# Patient Record
Sex: MALE | Race: WHITE | NOT HISPANIC OR LATINO | Employment: OTHER | URBAN - METROPOLITAN AREA
[De-identification: names, ages, dates, MRNs, and addresses within clinical notes are randomized per-mention and may not be internally consistent; named-entity substitution may affect disease eponyms.]

---

## 2017-12-20 ENCOUNTER — ESTABLISHED PATIENT (OUTPATIENT)
Dept: URBAN - METROPOLITAN AREA CLINIC 39 | Facility: CLINIC | Age: 77
End: 2017-12-20

## 2017-12-20 ENCOUNTER — SURGERY/PROCEDURE (OUTPATIENT)
Dept: URBAN - METROPOLITAN AREA SURGERY 14 | Facility: SURGERY | Age: 77
End: 2017-12-20

## 2017-12-20 DIAGNOSIS — H26.491: ICD-10-CM

## 2017-12-20 PROCEDURE — 66821 AFTER CATARACT LASER SURGERY: CPT

## 2017-12-20 PROCEDURE — 92014 COMPRE OPH EXAM EST PT 1/>: CPT

## 2017-12-20 ASSESSMENT — TONOMETRY
OD_IOP_MMHG: 14
OS_IOP_MMHG: 15

## 2017-12-20 ASSESSMENT — VISUAL ACUITY
OD_SC: 20/40+1
OS_SC: 20/40-1
OD_GLARE: 20/80
OD_SC: J12
OS_SC: J6

## 2017-12-28 ENCOUNTER — POST-OP (OUTPATIENT)
Dept: URBAN - METROPOLITAN AREA CLINIC 39 | Facility: CLINIC | Age: 77
End: 2017-12-28

## 2017-12-28 DIAGNOSIS — Z96.1: ICD-10-CM

## 2017-12-28 PROCEDURE — 99024 POSTOP FOLLOW-UP VISIT: CPT

## 2017-12-28 ASSESSMENT — VISUAL ACUITY
OD_SC: J10
OS_SC: J10
OS_SC: 20/30
OD_SC: 20/30-2

## 2017-12-28 ASSESSMENT — TONOMETRY
OS_IOP_MMHG: 14
OD_IOP_MMHG: 14

## 2018-06-22 ENCOUNTER — ESTABLISHED COMPREHENSIVE EXAM (OUTPATIENT)
Dept: URBAN - METROPOLITAN AREA CLINIC 39 | Facility: CLINIC | Age: 78
End: 2018-06-22

## 2018-06-22 DIAGNOSIS — H35.3130: ICD-10-CM

## 2018-06-22 DIAGNOSIS — H35.373: ICD-10-CM

## 2018-06-22 DIAGNOSIS — Z96.1: ICD-10-CM

## 2018-06-22 PROCEDURE — 92134 CPTRZ OPH DX IMG PST SGM RTA: CPT

## 2018-06-22 PROCEDURE — 92014 COMPRE OPH EXAM EST PT 1/>: CPT

## 2018-06-22 PROCEDURE — 92015 DETERMINE REFRACTIVE STATE: CPT

## 2018-06-22 ASSESSMENT — TONOMETRY
OD_IOP_MMHG: 17
OS_IOP_MMHG: 27

## 2018-06-22 ASSESSMENT — VISUAL ACUITY
OD_SC: J8
OS_SC: J3
OS_SC: 20/40+2
OD_SC: 20/40-2

## 2019-06-25 ENCOUNTER — ESTABLISHED COMPREHENSIVE EXAM (OUTPATIENT)
Dept: URBAN - METROPOLITAN AREA CLINIC 39 | Facility: CLINIC | Age: 79
End: 2019-06-25

## 2019-06-25 DIAGNOSIS — H35.3130: ICD-10-CM

## 2019-06-25 DIAGNOSIS — H43.813: ICD-10-CM

## 2019-06-25 DIAGNOSIS — H35.373: ICD-10-CM

## 2019-06-25 DIAGNOSIS — Z96.1: ICD-10-CM

## 2019-06-25 PROCEDURE — 92014 COMPRE OPH EXAM EST PT 1/>: CPT

## 2019-06-25 PROCEDURE — 92134 CPTRZ OPH DX IMG PST SGM RTA: CPT

## 2019-06-25 PROCEDURE — 92015 DETERMINE REFRACTIVE STATE: CPT

## 2019-06-25 ASSESSMENT — VISUAL ACUITY
OS_CC: J4
OD_SC: <J12
OD_CC: J6
OD_SC: 20/60+2
OS_SC: <J12
OS_SC: 20/50+1

## 2019-06-25 ASSESSMENT — TONOMETRY
OD_IOP_MMHG: 13
OS_IOP_MMHG: 15

## 2020-01-07 ENCOUNTER — FOLLOW UP (OUTPATIENT)
Dept: URBAN - METROPOLITAN AREA CLINIC 39 | Facility: CLINIC | Age: 80
End: 2020-01-07

## 2020-01-07 DIAGNOSIS — H35.373: ICD-10-CM

## 2020-01-07 DIAGNOSIS — H35.3130: ICD-10-CM

## 2020-01-07 PROCEDURE — 92134 CPTRZ OPH DX IMG PST SGM RTA: CPT

## 2020-01-07 PROCEDURE — 92012 INTRM OPH EXAM EST PATIENT: CPT

## 2020-01-07 ASSESSMENT — TONOMETRY
OD_IOP_MMHG: 17
OS_IOP_MMHG: 22

## 2020-01-07 ASSESSMENT — VISUAL ACUITY
OD_SC: <J12
OS_SC: 20/40+1
OU_SC: 20/30-2
OD_SC: 20/40-2
OD_CC: J3
OS_CC: J1
OU_SC: J10
OS_SC: <J12
OU_CC: J1

## 2020-07-06 NOTE — PATIENT DISCUSSION
New Prescription: prednisol ace-gatiflox-bromfen (prednisol ace-gatiflox-bromfen): drops,suspension: 1-0.5-0.075% 1 drop three times a day into affected eye 07-

## 2020-07-13 ENCOUNTER — ESTABLISHED COMPREHENSIVE EXAM (OUTPATIENT)
Dept: URBAN - METROPOLITAN AREA CLINIC 40 | Facility: CLINIC | Age: 80
End: 2020-07-13

## 2020-07-13 DIAGNOSIS — H35.373: ICD-10-CM

## 2020-07-13 DIAGNOSIS — H35.3130: ICD-10-CM

## 2020-07-13 DIAGNOSIS — H52.4: ICD-10-CM

## 2020-07-13 DIAGNOSIS — H52.11: ICD-10-CM

## 2020-07-13 DIAGNOSIS — H43.813: ICD-10-CM

## 2020-07-13 DIAGNOSIS — H52.202: ICD-10-CM

## 2020-07-13 PROCEDURE — 92499OP2 OPTOMAP RETINAL SCREENING BOTH EYES

## 2020-07-13 PROCEDURE — 92134 CPTRZ OPH DX IMG PST SGM RTA: CPT

## 2020-07-13 PROCEDURE — 92015 DETERMINE REFRACTIVE STATE: CPT

## 2020-07-13 PROCEDURE — 92014 COMPRE OPH EXAM EST PT 1/>: CPT

## 2020-07-13 ASSESSMENT — TONOMETRY
OS_IOP_MMHG: 20
OD_IOP_MMHG: 14

## 2020-07-13 ASSESSMENT — VISUAL ACUITY
OD_SC: <J12
OS_SC: 20/40-1
OU_CC: J1
OU_SC: 20/30-2
OD_CC: J3
OS_SC: <J12
OS_CC: J2

## 2020-08-21 NOTE — PATIENT DISCUSSION
- OK TO PROCEED WITH CATARACT SURGERY IN 5 DAYS.  PLANNING FOR STANDARD MONOFOCAL IOL FOR DISTANCE WITH KDB, LEFT EYE

## 2020-08-21 NOTE — PATIENT DISCUSSION
New Prescription: olopatadine (olopatadine): drops: 0.2% 1 drop twice a day as needed into both eyes 08-

## 2020-08-27 NOTE — PATIENT DISCUSSION
- Discussed the risks, benefits, and alternatives of cataract surgery including but not limited to infection, bleeding, posterior capsular tear, need for additional surgery including secondary IOL or vitrectomy, the continued need to wear glasses at both distance and near, and permanent loss of vision. There is no guarantee that cataract surgery will improve the patient's vision or reduce glare/halos. The patient appears to understand and wishes to proceed with surgery, OD.

## 2021-01-11 ENCOUNTER — FOLLOW UP (OUTPATIENT)
Dept: URBAN - METROPOLITAN AREA CLINIC 40 | Facility: CLINIC | Age: 81
End: 2021-01-11

## 2021-01-11 DIAGNOSIS — H35.373: ICD-10-CM

## 2021-01-11 DIAGNOSIS — H35.3130: ICD-10-CM

## 2021-01-11 PROCEDURE — 92250 FUNDUS PHOTOGRAPHY W/I&R: CPT

## 2021-01-11 PROCEDURE — 92012 INTRM OPH EXAM EST PATIENT: CPT

## 2021-01-11 ASSESSMENT — VISUAL ACUITY
OS_SC: 20/40-1
OD_CC: J5
OS_CC: J3
OD_SC: 20/60

## 2021-01-11 ASSESSMENT — TONOMETRY
OD_IOP_MMHG: 20
OS_IOP_MMHG: 21

## 2021-02-01 ENCOUNTER — REFRACTION ONLY (OUTPATIENT)
Dept: URBAN - METROPOLITAN AREA CLINIC 40 | Facility: CLINIC | Age: 81
End: 2021-02-01

## 2021-02-01 DIAGNOSIS — H35.373: ICD-10-CM

## 2021-02-01 DIAGNOSIS — H52.4: ICD-10-CM

## 2021-02-01 DIAGNOSIS — H52.11: ICD-10-CM

## 2021-02-01 DIAGNOSIS — H52.202: ICD-10-CM

## 2021-02-01 DIAGNOSIS — H35.3130: ICD-10-CM

## 2021-02-01 PROCEDURE — 92015GRNC REFRACTION GLASSES RECHECK - NO CHARGE

## 2021-02-01 ASSESSMENT — VISUAL ACUITY
OU_CC: J2
OS_SC: 20/40-1
OD_CC: 20/50
OD_CC: J5
OS_CC: J2
OS_CC: 20/30
OD_SC: J10
OD_SC: 20/70+2

## 2021-02-16 NOTE — PATIENT DISCUSSION
New Prescription: olopatadine (olopatadine): drops: 0.2% 1 drop twice a day as directed into both eyes 02-

## 2021-05-13 NOTE — PATIENT DISCUSSION
New Prescription: doxycycline hyclate (doxycycline hyclate): tablet: 100 mg 1 tablet twice a day as directed by mouth 05-

## 2021-05-13 NOTE — PATIENT DISCUSSION
New Prescription: Maxitrol (neomycin-polymyxin-dexameth): drops,suspension: 3.5-10,000-0.1 mg/mL-unit/mL-% 1 drop three times a day as directed into both eyes 05-

## 2021-05-28 NOTE — PATIENT DISCUSSION
Stopped Today: Maxitrol (neomycin-polymyxin-dexameth): drops,suspension: 3.5-10,000-0.1 mg/mL-unit/mL-% 1 drop three times a day as directed into both eyes 05-

## 2021-05-28 NOTE — PATIENT DISCUSSION
New Prescription: Maxitrol (neomycin-polymyxin-dexameth): ointment: 3.5-10,000-0.1 mg-unit/g-% 1 a small amount three times a day as directed on eyelid 05-

## 2021-07-12 ENCOUNTER — ESTABLISHED COMPREHENSIVE EXAM (OUTPATIENT)
Dept: URBAN - METROPOLITAN AREA CLINIC 40 | Facility: CLINIC | Age: 81
End: 2021-07-12

## 2021-07-12 DIAGNOSIS — H52.202: ICD-10-CM

## 2021-07-12 DIAGNOSIS — H35.3130: ICD-10-CM

## 2021-07-12 DIAGNOSIS — H52.11: ICD-10-CM

## 2021-07-12 DIAGNOSIS — H35.373: ICD-10-CM

## 2021-07-12 DIAGNOSIS — H52.4: ICD-10-CM

## 2021-07-12 DIAGNOSIS — H43.813: ICD-10-CM

## 2021-07-12 PROCEDURE — 92134 CPTRZ OPH DX IMG PST SGM RTA: CPT

## 2021-07-12 PROCEDURE — 92014 COMPRE OPH EXAM EST PT 1/>: CPT

## 2021-07-12 PROCEDURE — 92015 DETERMINE REFRACTIVE STATE: CPT

## 2021-07-12 ASSESSMENT — TONOMETRY
OD_IOP_MMHG: 16
OS_IOP_MMHG: 18

## 2021-07-12 ASSESSMENT — VISUAL ACUITY
OU_SC: 20/40
OU_SC: <J12
OS_SC: <J12
OU_CC: J5
OD_SC: <J12
OS_SC: 20/40
OD_SC: 20/200

## 2021-07-12 ASSESSMENT — KERATOMETRY
OS_AXISANGLE2_DEGREES: 164
OD_K2POWER_DIOPTERS: 44.00
OS_K2POWER_DIOPTERS: 43.75
OD_K1POWER_DIOPTERS: 43.75
OD_AXISANGLE2_DEGREES: 148
OD_AXISANGLE_DEGREES: 58
OS_AXISANGLE_DEGREES: 74
OS_K1POWER_DIOPTERS: 42.00

## 2021-07-26 NOTE — PATIENT DISCUSSION
New Prescription: Augmentin (amoxicillin-pot clavulanate): tablet: 875-125 mg 1 tablet twice a day as directed by mouth 07-

## 2021-08-27 NOTE — PATIENT DISCUSSION
- Follow up in 1 year for [facility], [facility]

## 2022-07-18 ENCOUNTER — COMPREHENSIVE EXAM (OUTPATIENT)
Dept: URBAN - METROPOLITAN AREA CLINIC 40 | Facility: CLINIC | Age: 82
End: 2022-07-18

## 2022-07-18 DIAGNOSIS — H35.3130: ICD-10-CM

## 2022-07-18 DIAGNOSIS — H35.373: ICD-10-CM

## 2022-07-18 DIAGNOSIS — H52.4: ICD-10-CM

## 2022-07-18 DIAGNOSIS — H43.813: ICD-10-CM

## 2022-07-18 DIAGNOSIS — H52.11: ICD-10-CM

## 2022-07-18 DIAGNOSIS — H52.202: ICD-10-CM

## 2022-07-18 PROCEDURE — 92014 COMPRE OPH EXAM EST PT 1/>: CPT

## 2022-07-18 PROCEDURE — 92015 DETERMINE REFRACTIVE STATE: CPT

## 2022-07-18 PROCEDURE — 92134 CPTRZ OPH DX IMG PST SGM RTA: CPT

## 2022-07-18 ASSESSMENT — KERATOMETRY
OS_AXISANGLE2_DEGREES: 164
OD_K2POWER_DIOPTERS: 44.00
OD_AXISANGLE_DEGREES: 58
OS_K2POWER_DIOPTERS: 43.75
OD_K1POWER_DIOPTERS: 43.75
OD_AXISANGLE2_DEGREES: 148
OS_AXISANGLE_DEGREES: 74
OS_K1POWER_DIOPTERS: 42.00

## 2022-07-18 ASSESSMENT — VISUAL ACUITY
OS_SC: 20/40-2
OU_SC: 20/40-2
OD_SC: 20/200-2
OD_SC: <J10
OU_CC: J3
OS_SC: <J10
OS_CC: J3
OD_CC: J6
OU_SC: <J10

## 2022-07-18 ASSESSMENT — TONOMETRY
OD_IOP_MMHG: 16
OS_IOP_MMHG: 17

## 2023-06-12 ENCOUNTER — COMPREHENSIVE EXAM (OUTPATIENT)
Dept: URBAN - METROPOLITAN AREA CLINIC 40 | Facility: CLINIC | Age: 83
End: 2023-06-12

## 2023-06-12 DIAGNOSIS — H35.3130: ICD-10-CM

## 2023-06-12 DIAGNOSIS — H52.4: ICD-10-CM

## 2023-06-12 DIAGNOSIS — H43.813: ICD-10-CM

## 2023-06-12 DIAGNOSIS — H35.373: ICD-10-CM

## 2023-06-12 DIAGNOSIS — H52.202: ICD-10-CM

## 2023-06-12 DIAGNOSIS — H52.11: ICD-10-CM

## 2023-06-12 PROCEDURE — 92014 COMPRE OPH EXAM EST PT 1/>: CPT

## 2023-06-12 PROCEDURE — 92250 FUNDUS PHOTOGRAPHY W/I&R: CPT

## 2023-06-12 PROCEDURE — 92015 DETERMINE REFRACTIVE STATE: CPT

## 2023-06-12 ASSESSMENT — KERATOMETRY
OD_AXISANGLE2_DEGREES: 148
OS_K1POWER_DIOPTERS: 42.00
OD_K1POWER_DIOPTERS: 43.75
OD_K2POWER_DIOPTERS: 44.00
OS_AXISANGLE2_DEGREES: 164
OS_K2POWER_DIOPTERS: 43.75
OD_AXISANGLE_DEGREES: 58
OS_AXISANGLE_DEGREES: 74

## 2023-06-12 ASSESSMENT — VISUAL ACUITY
OD_CC: J6
OS_CC: J5
OU_SC: 20/40-2
OS_SC: 20/40-2
OD_SC: <J10
OD_SC: 20/200
OU_SC: <J10
OS_SC: <J10
OU_CC: J3

## 2023-06-12 ASSESSMENT — TONOMETRY
OS_IOP_MMHG: 17
OD_IOP_MMHG: 17

## 2023-08-03 ENCOUNTER — CONSULTATION/EVALUATION (OUTPATIENT)
Dept: URBAN - METROPOLITAN AREA CLINIC 35 | Facility: CLINIC | Age: 83
End: 2023-08-03

## 2023-08-03 DIAGNOSIS — H35.09: ICD-10-CM

## 2023-08-03 DIAGNOSIS — H35.341: ICD-10-CM

## 2023-08-03 DIAGNOSIS — H35.363: ICD-10-CM

## 2023-08-03 DIAGNOSIS — H35.373: ICD-10-CM

## 2023-08-03 DIAGNOSIS — H35.30: ICD-10-CM

## 2023-08-03 DIAGNOSIS — H43.813: ICD-10-CM

## 2023-08-03 DIAGNOSIS — H35.3130: ICD-10-CM

## 2023-08-03 PROCEDURE — 92015GRNC REFRACTION GLASSES RECHECK - NO CHARGE

## 2023-08-03 PROCEDURE — 92235 FLUORESCEIN ANGRPH MLTIFRAME: CPT

## 2023-08-03 PROCEDURE — 92250 FUNDUS PHOTOGRAPHY W/I&R: CPT

## 2023-08-03 ASSESSMENT — KERATOMETRY
OS_K1POWER_DIOPTERS: 42.00
OS_AXISANGLE_DEGREES: 74
OD_AXISANGLE2_DEGREES: 148
OS_K2POWER_DIOPTERS: 43.75
OD_K2POWER_DIOPTERS: 44.00
OD_K1POWER_DIOPTERS: 43.75
OS_AXISANGLE2_DEGREES: 164
OD_AXISANGLE_DEGREES: 58

## 2023-08-03 ASSESSMENT — VISUAL ACUITY
OS_SC: 20/60-2
OD_SC: 20/200
OS_PH: 20/50

## 2023-08-03 ASSESSMENT — TONOMETRY
OS_IOP_MMHG: 19
OD_IOP_MMHG: 15

## 2023-08-16 ENCOUNTER — ESTABLISHED PATIENT (OUTPATIENT)
Dept: URBAN - METROPOLITAN AREA CLINIC 43 | Facility: CLINIC | Age: 83
End: 2023-08-16

## 2023-08-16 DIAGNOSIS — H35.3130: ICD-10-CM

## 2023-08-16 DIAGNOSIS — H35.373: ICD-10-CM

## 2023-08-16 DIAGNOSIS — H35.09: ICD-10-CM

## 2023-08-16 DIAGNOSIS — H35.30: ICD-10-CM

## 2023-08-16 DIAGNOSIS — H43.813: ICD-10-CM

## 2023-08-16 DIAGNOSIS — H35.363: ICD-10-CM

## 2023-08-16 DIAGNOSIS — H35.341: ICD-10-CM

## 2023-08-16 PROCEDURE — 92250 FUNDUS PHOTOGRAPHY W/I&R: CPT

## 2023-08-16 PROCEDURE — 99215 OFFICE O/P EST HI 40 MIN: CPT

## 2023-08-16 ASSESSMENT — KERATOMETRY
OD_AXISANGLE2_DEGREES: 148
OS_AXISANGLE2_DEGREES: 164
OS_K1POWER_DIOPTERS: 42.00
OD_K1POWER_DIOPTERS: 43.75
OD_AXISANGLE_DEGREES: 58
OD_K2POWER_DIOPTERS: 44.00
OS_K2POWER_DIOPTERS: 43.75
OS_AXISANGLE_DEGREES: 74

## 2023-08-16 ASSESSMENT — VISUAL ACUITY
OS_SC: 20/50
OD_SC: CF 5FT

## 2023-08-16 ASSESSMENT — TONOMETRY
OS_IOP_MMHG: 19
OD_IOP_MMHG: 19

## 2023-08-25 ENCOUNTER — POST-OP (OUTPATIENT)
Dept: URBAN - METROPOLITAN AREA CLINIC 39 | Facility: CLINIC | Age: 83
End: 2023-08-25

## 2023-08-25 DIAGNOSIS — H35.341: ICD-10-CM

## 2023-08-25 PROCEDURE — 99024 POSTOP FOLLOW-UP VISIT: CPT

## 2023-08-25 ASSESSMENT — TONOMETRY: OD_IOP_MMHG: 09

## 2023-08-31 ENCOUNTER — POST-OP (OUTPATIENT)
Dept: URBAN - METROPOLITAN AREA CLINIC 35 | Facility: CLINIC | Age: 83
End: 2023-08-31

## 2023-08-31 DIAGNOSIS — S05.11XA: ICD-10-CM

## 2023-08-31 DIAGNOSIS — H35.341: ICD-10-CM

## 2023-08-31 DIAGNOSIS — H40.051: ICD-10-CM

## 2023-08-31 PROCEDURE — 92250 FUNDUS PHOTOGRAPHY W/I&R: CPT

## 2023-08-31 PROCEDURE — 99214 OFFICE O/P EST MOD 30 MIN: CPT

## 2023-08-31 RX ORDER — BRIMONIDINE TARTRATE, TIMOLOL MALEATE 2; 5 MG/ML; MG/ML
1 SOLUTION/ DROPS OPHTHALMIC TWICE A DAY
Start: 2023-08-31

## 2023-08-31 ASSESSMENT — TONOMETRY
OS_IOP_MMHG: 15
OD_IOP_MMHG: 28

## 2023-08-31 ASSESSMENT — VISUAL ACUITY: OS_SC: 20/60

## 2023-09-06 ENCOUNTER — FOLLOW UP (OUTPATIENT)
Dept: URBAN - METROPOLITAN AREA CLINIC 36 | Facility: CLINIC | Age: 83
End: 2023-09-06

## 2023-09-06 DIAGNOSIS — H27.121: ICD-10-CM

## 2023-09-06 DIAGNOSIS — H40.051: ICD-10-CM

## 2023-09-06 DIAGNOSIS — S05.11XD: ICD-10-CM

## 2023-09-06 DIAGNOSIS — H35.341: ICD-10-CM

## 2023-09-06 PROCEDURE — 99215 OFFICE O/P EST HI 40 MIN: CPT

## 2023-09-06 ASSESSMENT — TONOMETRY: OD_IOP_MMHG: 24

## 2023-09-07 ENCOUNTER — SURGERY/PROCEDURE (OUTPATIENT)
Dept: URBAN - METROPOLITAN AREA CLINIC 35 | Facility: CLINIC | Age: 83
End: 2023-09-07

## 2023-09-07 DIAGNOSIS — H40.051: ICD-10-CM

## 2023-09-07 DIAGNOSIS — H27.121: ICD-10-CM

## 2023-09-07 DIAGNOSIS — H35.341: ICD-10-CM

## 2023-09-07 DIAGNOSIS — S05.11XD: ICD-10-CM

## 2023-09-07 PROCEDURE — 67036 REMOVAL OF INNER EYE FLUID: CPT

## 2023-09-08 ENCOUNTER — POST-OP (OUTPATIENT)
Dept: URBAN - METROPOLITAN AREA CLINIC 43 | Facility: CLINIC | Age: 83
End: 2023-09-08

## 2023-09-08 DIAGNOSIS — H35.341: ICD-10-CM

## 2023-09-08 DIAGNOSIS — H27.121: ICD-10-CM

## 2023-09-08 DIAGNOSIS — H40.051: ICD-10-CM

## 2023-09-08 DIAGNOSIS — S05.11XD: ICD-10-CM

## 2023-09-08 PROCEDURE — 99024 POSTOP FOLLOW-UP VISIT: CPT

## 2023-09-08 ASSESSMENT — TONOMETRY: OD_IOP_MMHG: 28

## 2023-09-08 ASSESSMENT — VISUAL ACUITY: OD_SC: CF 1FT

## 2023-09-15 ENCOUNTER — POST-OP (OUTPATIENT)
Dept: URBAN - METROPOLITAN AREA CLINIC 43 | Facility: CLINIC | Age: 83
End: 2023-09-15

## 2023-09-15 DIAGNOSIS — H35.341: ICD-10-CM

## 2023-09-15 DIAGNOSIS — S05.11XD: ICD-10-CM

## 2023-09-15 DIAGNOSIS — H40.051: ICD-10-CM

## 2023-09-15 DIAGNOSIS — H27.121: ICD-10-CM

## 2023-09-15 PROCEDURE — 99024 POSTOP FOLLOW-UP VISIT: CPT

## 2023-09-15 PROCEDURE — 92250 FUNDUS PHOTOGRAPHY W/I&R: CPT

## 2023-09-15 RX ORDER — PREDNISOLONE ACETATE 10 MG/ML
1 SUSPENSION/ DROPS OPHTHALMIC
Start: 2023-09-15

## 2023-09-15 ASSESSMENT — TONOMETRY: OD_IOP_MMHG: 11

## 2023-09-15 ASSESSMENT — VISUAL ACUITY: OD_SC: 20/400

## 2023-10-19 ENCOUNTER — POST-OP (OUTPATIENT)
Dept: URBAN - METROPOLITAN AREA CLINIC 35 | Facility: CLINIC | Age: 83
End: 2023-10-19

## 2023-10-19 DIAGNOSIS — H27.121: ICD-10-CM

## 2023-10-19 DIAGNOSIS — S05.11XD: ICD-10-CM

## 2023-10-19 DIAGNOSIS — H40.051: ICD-10-CM

## 2023-10-19 DIAGNOSIS — H35.341: ICD-10-CM

## 2023-10-19 PROCEDURE — 92250 FUNDUS PHOTOGRAPHY W/I&R: CPT

## 2023-10-19 PROCEDURE — 99024 POSTOP FOLLOW-UP VISIT: CPT | Mod: 24

## 2023-10-19 ASSESSMENT — VISUAL ACUITY
OS_SC: 20/60+1
OD_SC: CF 5FT

## 2023-10-19 ASSESSMENT — TONOMETRY
OS_IOP_MMHG: 12
OD_IOP_MMHG: 10

## 2023-12-14 ENCOUNTER — ESTABLISHED PATIENT (OUTPATIENT)
Dept: URBAN - METROPOLITAN AREA CLINIC 35 | Facility: CLINIC | Age: 83
End: 2023-12-14

## 2023-12-14 DIAGNOSIS — S05.11XD: ICD-10-CM

## 2023-12-14 DIAGNOSIS — H35.341: ICD-10-CM

## 2023-12-14 DIAGNOSIS — H40.051: ICD-10-CM

## 2023-12-14 DIAGNOSIS — H27.121: ICD-10-CM

## 2023-12-14 PROCEDURE — 99213 OFFICE O/P EST LOW 20 MIN: CPT

## 2023-12-14 PROCEDURE — 92250 FUNDUS PHOTOGRAPHY W/I&R: CPT

## 2023-12-14 ASSESSMENT — TONOMETRY
OD_IOP_MMHG: 21
OS_IOP_MMHG: 17

## 2023-12-14 ASSESSMENT — VISUAL ACUITY
OS_SC: 20/60-1
OD_SC: CF 5FT

## 2024-04-11 ENCOUNTER — COMPREHENSIVE EXAM (OUTPATIENT)
Dept: URBAN - METROPOLITAN AREA CLINIC 35 | Facility: CLINIC | Age: 84
End: 2024-04-11

## 2024-04-11 DIAGNOSIS — S05.11XD: ICD-10-CM

## 2024-04-11 DIAGNOSIS — H35.341: ICD-10-CM

## 2024-04-11 DIAGNOSIS — H35.3130: ICD-10-CM

## 2024-04-11 DIAGNOSIS — H35.30: ICD-10-CM

## 2024-04-11 DIAGNOSIS — H35.3124: ICD-10-CM

## 2024-04-11 DIAGNOSIS — H35.09: ICD-10-CM

## 2024-04-11 DIAGNOSIS — H27.121: ICD-10-CM

## 2024-04-11 DIAGNOSIS — H35.363: ICD-10-CM

## 2024-04-11 DIAGNOSIS — H35.372: ICD-10-CM

## 2024-04-11 DIAGNOSIS — H40.051: ICD-10-CM

## 2024-04-11 PROCEDURE — 92134 CPTRZ OPH DX IMG PST SGM RTA: CPT

## 2024-04-11 PROCEDURE — 99214 OFFICE O/P EST MOD 30 MIN: CPT

## 2024-04-11 PROCEDURE — 92242 FLUORESCEIN&ICG ANGIOGRAPHY: CPT

## 2024-04-11 ASSESSMENT — VISUAL ACUITY
OS_PH: 20/60-1
OD_SC: CF 5FT
OS_SC: 20/60-2

## 2024-04-11 ASSESSMENT — TONOMETRY
OD_IOP_MMHG: 18
OS_IOP_MMHG: 19

## 2024-06-13 ENCOUNTER — ESTABLISHED PATIENT (OUTPATIENT)
Dept: URBAN - METROPOLITAN AREA CLINIC 35 | Facility: CLINIC | Age: 84
End: 2024-06-13

## 2024-06-13 DIAGNOSIS — H35.341: ICD-10-CM

## 2024-06-13 DIAGNOSIS — H40.051: ICD-10-CM

## 2024-06-13 DIAGNOSIS — H27.121: ICD-10-CM

## 2024-06-13 DIAGNOSIS — H35.3124: ICD-10-CM

## 2024-06-13 DIAGNOSIS — H35.30: ICD-10-CM

## 2024-06-13 DIAGNOSIS — H35.372: ICD-10-CM

## 2024-06-13 DIAGNOSIS — H35.363: ICD-10-CM

## 2024-06-13 DIAGNOSIS — H35.09: ICD-10-CM

## 2024-06-13 DIAGNOSIS — H35.3110: ICD-10-CM

## 2024-06-13 DIAGNOSIS — H04.123: ICD-10-CM

## 2024-06-13 PROCEDURE — 92250 FUNDUS PHOTOGRAPHY W/I&R: CPT

## 2024-06-13 PROCEDURE — 67028 INJECTION EYE DRUG: CPT

## 2024-06-13 PROCEDURE — 99214 OFFICE O/P EST MOD 30 MIN: CPT | Mod: 25

## 2024-06-13 ASSESSMENT — VISUAL ACUITY
OD_SC: 20/400
OS_SC: 20/70-2

## 2024-06-13 ASSESSMENT — TONOMETRY
OD_IOP_MMHG: 15
OS_IOP_MMHG: 16

## 2024-07-18 ENCOUNTER — CLINIC PROCEDURE ONLY (OUTPATIENT)
Dept: URBAN - METROPOLITAN AREA CLINIC 35 | Facility: CLINIC | Age: 84
End: 2024-07-18

## 2024-07-18 DIAGNOSIS — H35.3124: ICD-10-CM

## 2024-07-18 PROCEDURE — 67028 INJECTION EYE DRUG: CPT

## 2024-07-18 PROCEDURE — 92250 FUNDUS PHOTOGRAPHY W/I&R: CPT

## 2024-07-18 ASSESSMENT — VISUAL ACUITY
OD_SC: CF 5FT
OS_PH: 20/80+2
OS_SC: 20/80-1

## 2024-07-18 ASSESSMENT — TONOMETRY
OD_IOP_MMHG: 16
OS_IOP_MMHG: 17

## 2024-09-12 ENCOUNTER — COMPREHENSIVE EXAM (OUTPATIENT)
Dept: URBAN - METROPOLITAN AREA CLINIC 35 | Facility: CLINIC | Age: 84
End: 2024-09-12

## 2024-09-12 DIAGNOSIS — H35.30: ICD-10-CM

## 2024-09-12 DIAGNOSIS — H04.123: ICD-10-CM

## 2024-09-12 DIAGNOSIS — H35.3124: ICD-10-CM

## 2024-09-12 DIAGNOSIS — H35.372: ICD-10-CM

## 2024-09-12 DIAGNOSIS — H35.723: ICD-10-CM

## 2024-09-12 DIAGNOSIS — H35.3112: ICD-10-CM

## 2024-09-12 PROCEDURE — 92134 CPTRZ OPH DX IMG PST SGM RTA: CPT

## 2024-09-12 PROCEDURE — 67028 INJECTION EYE DRUG: CPT

## 2024-09-12 PROCEDURE — 92242 FLUORESCEIN&ICG ANGIOGRAPHY: CPT

## 2024-09-12 PROCEDURE — 99213 OFFICE O/P EST LOW 20 MIN: CPT | Mod: 25

## 2024-11-07 ENCOUNTER — CLINIC PROCEDURE ONLY (OUTPATIENT)
Dept: URBAN - METROPOLITAN AREA CLINIC 35 | Facility: CLINIC | Age: 84
End: 2024-11-07

## 2024-11-07 DIAGNOSIS — H35.3124: ICD-10-CM

## 2024-11-07 PROCEDURE — 67028 INJECTION EYE DRUG: CPT

## 2024-11-07 PROCEDURE — 92250 FUNDUS PHOTOGRAPHY W/I&R: CPT

## 2025-01-16 ENCOUNTER — COMPREHENSIVE EXAM (OUTPATIENT)
Age: 85
End: 2025-01-16

## 2025-01-16 DIAGNOSIS — H35.30: ICD-10-CM

## 2025-01-16 DIAGNOSIS — H35.3124: ICD-10-CM

## 2025-01-16 DIAGNOSIS — H35.09: ICD-10-CM

## 2025-01-16 DIAGNOSIS — H04.123: ICD-10-CM

## 2025-01-16 DIAGNOSIS — H35.372: ICD-10-CM

## 2025-01-16 DIAGNOSIS — H35.363: ICD-10-CM

## 2025-01-16 DIAGNOSIS — H43.813: ICD-10-CM

## 2025-01-16 DIAGNOSIS — H35.341: ICD-10-CM

## 2025-01-16 DIAGNOSIS — H35.033: ICD-10-CM

## 2025-01-16 PROCEDURE — 67028 INJECTION EYE DRUG: CPT

## 2025-01-16 PROCEDURE — 99213 OFFICE O/P EST LOW 20 MIN: CPT | Mod: 25

## 2025-01-16 PROCEDURE — 92134 CPTRZ OPH DX IMG PST SGM RTA: CPT

## 2025-03-13 NOTE — PATIENT DISCUSSION
Continue: neomycin-polymyxin-dexameth (neomycin-polymyxin-dexameth): ointment: 3.5-10,000-0.1 mg-unit/g-% [de-identified] : PET (rest and stress) 3/11/2025: Myocardial scar burden of 16.2%. No transient ischemic dilation. Myocardial flow reserve> 2 in all areas, moderate degree of non-transmural myocardial scarring in the apex, apical inferior, apical lateral and mid basal inferolateral walls.  LVEF 58%. Nuclear SPECT 1/16/2024: 6: 39 min, 8 METS, MPHR 88%, no chest pain or symptoms during exercise.  Had a large sized moderate to severe fixed defect in the lateral inferolateral and apical septal walls. 4/29/2024: LVEF 50%, LVEDD 5 cm, IVSd 0.8 cm, hypokinetic inferolateral walls, normal RV function, mild LAE, mild to mod MR, no pericardial effusion. [de-identified] : 1/14/2025: LVEF 55 to 60%, LVEDD 4.4 cm, IVSd 1.2 cm, GLS -18%, normal RV TAPSE 2.7 cm, trace MR, trace TR, PASP 9 mmHg, normal IVC. [de-identified] : [facility] 2/26/2025: LVEF 48%, LVEDV 142 mL, no resting perfusion defect, transmural LGE of the mid inferolateral wall and apical lateral wall. [facility] 6/27/24: LVEF 47%, LVEDV 146mL, RVEF 62%, RVEDV 84 mL, increase in subepicardial, mid wall enhancement of the basal anterolateral, lateral and apical lateral segments. [facility] 5/20/24: LVEF 53%, LVEDD 123 mL, RVEF 67%, RV EDV 93 mL, base mid subepicardial enhancement in the lateral and inferolateral walls -suspect subacute myocarditis. [de-identified] : PET scan 7/15/24 (not a cardiac dedicated PET scan):  LV wall focal uptake with SUV 3.3. 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 [facility] 4/29/2024: Nonobstructive CAD.  BLESSING-3 flow all vessels.  LVEDP 23. [de-identified] : Endomyocardial biopsy 7/2/2024:  Myocardium with increased lipofuscin pigment.  No inflammation, granulomas or vasculitis is present.

## 2025-03-20 ENCOUNTER — COMPREHENSIVE EXAM (OUTPATIENT)
Age: 85
End: 2025-03-20

## 2025-03-20 DIAGNOSIS — H35.372: ICD-10-CM

## 2025-03-20 DIAGNOSIS — H35.363: ICD-10-CM

## 2025-03-20 DIAGNOSIS — H04.123: ICD-10-CM

## 2025-03-20 DIAGNOSIS — H35.341: ICD-10-CM

## 2025-03-20 DIAGNOSIS — H43.813: ICD-10-CM

## 2025-03-20 DIAGNOSIS — H35.3124: ICD-10-CM

## 2025-03-20 DIAGNOSIS — H35.30: ICD-10-CM

## 2025-03-20 DIAGNOSIS — H35.09: ICD-10-CM

## 2025-03-20 DIAGNOSIS — H35.033: ICD-10-CM

## 2025-03-20 PROCEDURE — 92242 FLUORESCEIN&ICG ANGIOGRAPHY: CPT

## 2025-03-20 PROCEDURE — 67028 INJECTION EYE DRUG: CPT

## 2025-03-20 PROCEDURE — 92134 CPTRZ OPH DX IMG PST SGM RTA: CPT

## 2025-03-20 PROCEDURE — 99213 OFFICE O/P EST LOW 20 MIN: CPT | Mod: 25

## 2025-05-08 ENCOUNTER — CLINIC PROCEDURE ONLY (OUTPATIENT)
Age: 85
End: 2025-05-08

## 2025-05-08 DIAGNOSIS — H35.3124: ICD-10-CM

## 2025-05-08 PROCEDURE — 92250 FUNDUS PHOTOGRAPHY W/I&R: CPT

## 2025-05-08 PROCEDURE — 67028 INJECTION EYE DRUG: CPT

## 2025-06-19 ENCOUNTER — COMPREHENSIVE EXAM (OUTPATIENT)
Age: 85
End: 2025-06-19

## 2025-06-19 DIAGNOSIS — H35.09: ICD-10-CM

## 2025-06-19 DIAGNOSIS — H35.372: ICD-10-CM

## 2025-06-19 DIAGNOSIS — H35.3124: ICD-10-CM

## 2025-06-19 DIAGNOSIS — H35.033: ICD-10-CM

## 2025-06-19 DIAGNOSIS — H35.30: ICD-10-CM

## 2025-06-19 DIAGNOSIS — H43.813: ICD-10-CM

## 2025-06-19 DIAGNOSIS — H35.341: ICD-10-CM

## 2025-06-19 DIAGNOSIS — H35.363: ICD-10-CM

## 2025-06-19 PROCEDURE — 92134 CPTRZ OPH DX IMG PST SGM RTA: CPT

## 2025-06-19 PROCEDURE — 92273 FULL FIELD ERG W/I&R: CPT

## 2025-06-19 PROCEDURE — 99213 OFFICE O/P EST LOW 20 MIN: CPT | Mod: 25

## 2025-06-19 PROCEDURE — 67028 INJECTION EYE DRUG: CPT

## 2025-08-14 ENCOUNTER — CLINIC PROCEDURE ONLY (OUTPATIENT)
Age: 85
End: 2025-08-14

## 2025-08-14 DIAGNOSIS — H35.722: ICD-10-CM

## 2025-08-14 DIAGNOSIS — H35.3124: ICD-10-CM

## 2025-08-14 PROCEDURE — 92242 FLUORESCEIN&ICG ANGIOGRAPHY: CPT

## 2025-08-14 PROCEDURE — 92134 CPTRZ OPH DX IMG PST SGM RTA: CPT

## 2025-08-14 PROCEDURE — 67028 INJECTION EYE DRUG: CPT
